# Patient Record
Sex: MALE | Race: BLACK OR AFRICAN AMERICAN | NOT HISPANIC OR LATINO | Employment: FULL TIME | URBAN - METROPOLITAN AREA
[De-identification: names, ages, dates, MRNs, and addresses within clinical notes are randomized per-mention and may not be internally consistent; named-entity substitution may affect disease eponyms.]

---

## 2017-04-22 ENCOUNTER — HOSPITAL ENCOUNTER (EMERGENCY)
Facility: MEDICAL CENTER | Age: 27
End: 2017-04-22
Attending: EMERGENCY MEDICINE
Payer: COMMERCIAL

## 2017-04-22 ENCOUNTER — APPOINTMENT (OUTPATIENT)
Dept: RADIOLOGY | Facility: MEDICAL CENTER | Age: 27
End: 2017-04-22
Attending: EMERGENCY MEDICINE
Payer: COMMERCIAL

## 2017-04-22 VITALS
BODY MASS INDEX: 26.04 KG/M2 | RESPIRATION RATE: 16 BRPM | SYSTOLIC BLOOD PRESSURE: 151 MMHG | HEIGHT: 76 IN | OXYGEN SATURATION: 96 % | HEART RATE: 66 BPM | WEIGHT: 213.85 LBS | TEMPERATURE: 98.2 F | DIASTOLIC BLOOD PRESSURE: 60 MMHG

## 2017-04-22 DIAGNOSIS — M79.641 RIGHT HAND PAIN: ICD-10-CM
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
## 2017-04-22 DIAGNOSIS — S62.201A CLOSED FRACTURE OF FIRST METACARPAL BONE OF RIGHT HAND, UNSPECIFIED FRACTURE MORPHOLOGY, UNSPECIFIED PORTION OF METACARPAL, INITIAL ENCOUNTER: ICD-10-CM

## 2017-04-22 PROCEDURE — 99284 EMERGENCY DEPT VISIT MOD MDM: CPT

## 2017-04-22 PROCEDURE — 73130 X-RAY EXAM OF HAND: CPT | Mod: RT

## 2017-04-22 NOTE — ED AVS SNAPSHOT
Aerovance Access Code: [email or web address]  Expires: 5/22/2017  2:10 AM    Aerovance  A secure, online tool to manage your health information     [employer]’s Aerovance® is a secure, online tool that connects you to your personalized health information from the privacy of your home -- day or night - making it very easy for you to manage your healthcare. Once the activation process is completed, you can even access your medical information using the Aerovance cameron, which is available for free in the [employer] Cameron store or Google Play store.     Aerovance provides the following levels of access (as shown below):   My Chart Features   [facility] Primary Care Doctor [facility]  Specialists [facility]  Urgent  Care Non-[facility]  Primary Care  Doctor   Email your healthcare team securely and privately 24/7 X X X X   Manage appointments: schedule your next appointment; view details of past/upcoming appointments X      Request prescription refills. X      View recent personal medical records, including lab and immunizations X X X X   View health record, including health history, allergies, medications X X X X   Read reports about your outpatient visits, procedures, consult and ER notes X X X X   See your discharge summary, which is a recap of your hospital and/or ER visit that includes your diagnosis, lab results, and care plan. X X       How to register for Aerovance:  1. Go to  [email or web address].  2. Click on the Sign Up Now box, which takes you to the New Member Sign Up page. You will need to provide the following information:  a. Enter your Aerovance Access Code exactly as it appears at the top of this page. (You will not need to use this code after you’ve completed the sign-up process. If you do not sign up before the expiration date, you must request a new code.)   b. Enter your date of birth.   c. Enter your home email address.   d. Click Submit, and follow the next screen’s instructions.  3. Create a Aerovance ID. This will be your [employer]  login ID and cannot be changed, so think of one that is secure and easy to remember.  4. Create a [employer] password. You can change your password at any time.  5. Enter your Password Reset Question and Answer. This can be used at a later time if you forget your password.   6. Enter your e-mail address. This allows you to receive e-mail notifications when new information is available in [employer].  7. Click Sign Up. You can now view your health information.    For assistance activating your [employer] account, call [phone]

## 2017-04-22 NOTE — ED PROVIDER NOTES
"ED Provider Note    CHIEF COMPLAINT  Chief Complaint   Patient presents with   • Hand Injury       HPI  Inessa Byers is a 26 y.o. male who presents tonight with his significant other with a chief complaint of right thumb and hand pain after he injured it falling yesterday. He states he slipped and fell backwards and hyperextended his right thumb. He now complains of increased swelling. He denies any distal paresthesias or any other areas of injury.    REVIEW OF SYSTEMS  See HPI for further details. All other system reviews are negative.    PAST MEDICAL HISTORY  History reviewed. No pertinent past medical history.    FAMILY HISTORY  History reviewed. No pertinent family history.    SOCIAL HISTORY  Social History     Social History   • Marital Status: Single     Spouse Name: N/A   • Number of Children: N/A   • Years of Education: N/A     Social History Main Topics   • Smoking status: Current Some Day Smoker   • Smokeless tobacco: None   • Alcohol Use: Yes   • Drug Use: No   • Sexual Activity: Not Asked     Other Topics Concern   • None     Social History Narrative       SURGICAL HISTORY  History reviewed. No pertinent past surgical history.    CURRENT MEDICATIONS  None    ALLERGIES  No known drug allergies    PHYSICAL EXAM  VITAL SIGNS: /76 mmHg  Pulse 76  Temp(Src) 36.8 °C (98.2 °F)  Resp 16  Ht 1.93 m (6' 4\")  Wt 97 kg (213 lb 13.5 oz)  BMI 26.04 kg/m2  SpO2 95%    Constitutional: Patient is well developed, well nourished in mild distress.  HENT: Normocephalic, atraumatic,  Oropharynx moist , nose normal with no drainage.   Eyes: PERRL, EOMI, Conjunctiva without erythema.  Neck: Supple with  Normal range of motion in flexion, extension and lateral rotation. No tenderness along the bony prominences or paraspinal muscles.   Cardiovascular: Normal heart rate and rhythm. No murmur.  Thorax & Lungs: Clear and equal breath sounds with good excursion. No respiratory distress.  Abdomen: Bowel sounds normal " in all four quadrants. Soft,nontender.  Skin: Warm, Dry, No rashes or abrasions.  Extremities: Peripheral pulses 4/4, right hand reveals a marked amount of soft tissue swelling with ecchymosis on the right thumb extending into the thenar eminence, anatomical snuff box and metacarpal region of the thumb and index fingers. There are no open abrasions, lacerations noted. He has good capillary refill, good distal sensation with limited range of motion of the thumb secondary to pain and swelling.  Musculoskeletal: Normal range of motion in all major joints. No tenderness to palpation.  Neurologic: Alert & oriented x 3, Normal motor function, Normal sensory function.  Psychiatric: Affect normal, Judgment normal, Mood normal.       RADIOLOGY/PROCEDURES  DX-HAND 3+ RIGHT   Final Result         Linear lucency at the base of the first metacarpal, correlate for nondisplaced fracture.      Questionable mild subluxation of the first MCP joint as well. Correlate clinically.            COURSE & MEDICAL DECISION MAKING  Pertinent Labs & Imaging studies reviewed. (See chart for details)  X-ray performed shows a lucency at the base of the 1st metacarpal with possible nondisplaced fracture and questionable mild subluxation of the 1st MCP joint. Patient was placed in a thumb spica splint and will be referred to orthopedic surgery which is Dr. Dayanara Pal on call today. The patient is to ice for the next 24 hours keep it elevated and keep the splint on and call 1st thing Monday morning to follow up with the hand surgeon. He did not want anything for pain at this time therefore he is to take high-dose Motrin 800 mg every 8 hours with food to help reduce the inflammation and pain. He is to return if any numbness or coldness in his fingers.    FINAL IMPRESSION  1.recent fall  2. Right hand pain  3. Closed fracture base of 1st metacarpal         Electronically signed by: Geovanna Campos, 4/22/2017 1:57 AM

## 2017-04-22 NOTE — ED AVS SNAPSHOT
Home Care Instructions                                                                                                                Inessa Byers   MRN: [id number]    Department:  [facility], Emergency Dept   Date of Visit:  4/22/2017            [facility], Emergency Dept    [address] [place] [address]    Phone:  [phone]      You were seen by     Geovanna Campos D.O.      Your Diagnosis Was     Right hand pain     M79.641       Follow-up Information     1. Follow up with Jasper Ramirez M.D.. Schedule an appointment as soon as possible for a visit in 2 days.    Specialty:  Orthopaedics    Why:  call 1st thing Monday morning to schedule an appointment with ortho    Contact information    [address]  [address]  [place] [address] [phone]        Medication Information     Review all of your home medications and newly ordered medications with your primary doctor and/or pharmacist as soon as possible. Follow medication instructions as directed by your doctor and/or pharmacist.     Please keep your complete medication list with you and share with your physician. Update the information when medications are discontinued, doses are changed, or new medications (including over-the-counter products) are added; and carry medication information at all times in the event of emergency situations.               Medication List      Notice     You have not been prescribed any medications.            Procedures and tests performed during your visit     DX-HAND 3+ RIGHT        Discharge Instructions       Cast or Splint Care  Casts and splints support injured limbs and keep bones from moving while they heal. It is important to care for your cast or splint at home.    HOME CARE INSTRUCTIONS  · Keep the cast or splint uncovered during the drying period. It can take 24 to 48 hours to dry if it is made of plaster. A fiberglass cast will dry in less than  1 hour.  · Do not rest the cast on anything harder than a pillow for the first 24 hours.  · Do not put weight on your injured limb or apply pressure to the cast until your health care provider gives you permission.  · Keep the cast or splint dry. Wet casts or splints can lose their shape and may not support the limb as well. A wet cast that has lost its shape can also create harmful pressure on your skin when it dries. Also, wet skin can become infected.  · Cover the cast or splint with a plastic bag when bathing or when out in the rain or snow. If the cast is on the trunk of the body, take sponge baths until the cast is removed.  · If your cast does become wet, dry it with a towel or a blow dryer on the cool setting only.  · Keep your cast or splint clean. Soiled casts may be wiped with a moistened cloth.  · Do not place any hard or soft foreign objects under your cast or splint, such as cotton, toilet paper, lotion, or powder.  · Do not try to scratch the skin under the cast with any object. The object could get stuck inside the cast. Also, scratching could lead to an infection. If itching is a problem, use a blow dryer on a cool setting to relieve discomfort.  · Do not trim or cut your cast or remove padding from inside of it.  · Exercise all joints next to the injury that are not immobilized by the cast or splint. For example, if you have a long leg cast, exercise the hip joint and toes. If you have an arm cast or splint, exercise the shoulder, elbow, thumb, and fingers.  · Elevate your injured arm or leg on 1 or 2 pillows for the first 1 to 3 days to decrease swelling and pain. It is best if you can comfortably elevate your cast so it is higher than your heart.  SEEK MEDICAL CARE IF:   · Your cast or splint cracks.  · Your cast or splint is too tight or too loose.  · You have unbearable itching inside the cast.  · Your cast becomes wet or develops a soft spot or area.  · You have a bad smell coming from inside  your cast.  · You get an object stuck under your cast.  · Your skin around the cast becomes red or raw.  · You have new pain or worsening pain after the cast has been applied.  SEEK IMMEDIATE MEDICAL CARE IF:   · You have fluid leaking through the cast.  · You are unable to move your fingers or toes.  · You have discolored (blue or white), cool, painful, or very swollen fingers or toes beyond the cast.  · You have tingling or numbness around the injured area.  · You have severe pain or pressure under the cast.  · You have any difficulty with your breathing or have shortness of breath.  · You have chest pain.     This information is not intended to replace advice given to you by your health care provider. Make sure you discuss any questions you have with your health care provider.     Document Released: 12/15/2001 Document Revised: 10/08/2014 Document Reviewed: 06/26/2014  [employer] Interactive Patient Education ©2016 [employer]    Hand Fracture  Your caregiver has diagnosed you with a fractured (broken) bone in your hand. If the bones are in good position and the hand is properly immobilized and rested, these injuries will usually heal in 3 to 6 weeks. A cast, splint, or bulky bandage is usually applied to keep the fracture site from moving. Do not remove the splint or cast until your caregiver approves. If the fracture is unstable or the bones are not aligned properly, surgery may be needed.  Keep your hand raised (elevated) above the level of your heart as much as possible for the next 2 to 3 days until the swelling and pain are better. Apply ice packs for 15-20 minutes every 3 to 4 hours to help control the pain and swelling.  See your caregiver or an orthopedic specialist as directed for follow-up care to make sure the fracture is beginning to heal properly.  SEEK IMMEDIATE MEDICAL CARE IF:   · You notice your fingers are cold, numb, crooked, or the pain of your injury is severe.  · You are not improving or  seem to be getting worse.  · You have questions or concerns.  Document Released: 01/25/2006 Document Revised: 03/11/2013 Document Reviewed: 06/15/2010  ExitCare® Patient Information ©2014 [employer].    Hand Injuries  Minor breaks (fractures), sprains, bruises (contusions), and burns of the hand are all examples of hand injuries. A fracture is a break in the bone. A sprain means that ligaments have been stretched or torn. A contusion is the result of an injury that caused bleeding under the skin. Burns are damage to the skin that occurs when the hand comes in contact with something very hot (or with certain chemicals).   HOME CARE INSTRUCTIONS  · For sprains, keep your hand raised (elevated) above the level of your heart. Do this until the pain and swelling improve.   · Use hand bandages (dressings) and splints to reduce motion, relieve pain, and prevent reinjury as directed. The dressing and splint should not be removed without your caregiver's approval.   · Put ice on the injured area to reduce pain and swelling due to fractures, sprains, and deep bruises.   · Put ice in a plastic bag.   · Place a towel between your skin and the bag.   · Leave the ice on for 15-20 minutes, 3-4 times a day. Do this for 2 3 days.   · Only take over-the-counter or prescription medicines as directed by your caregiver.   · Follow up with your caregiver or a specialist as directed.   Early motion exercises are sometimes needed to reduce joint stiffness after a hand injury. However, your hand should not be used for any activities that increase pain.  SEEK MEDICAL CARE IF:  · Your pain or swelling does not improve in 2 3 days.   SEEK IMMEDIATE MEDICAL CARE IF:  · You have increased pain, swelling, or redness in your hand.   · Your pain is not controlled with medicine.   · You have a fever or persistent symptoms for more than 2 3 days.   · You have a fever and your symptoms suddenly get worse.   · You have pain when moving your fingers.    · You have pus coming from the wound.   · You have numbness in your fingers.   MAKE SURE YOU:  · Understand these instructions.   · Will watch your condition.   · Will get help right away if you are not doing well or get worse.   Document Released: 01/25/2006 Document Revised: 09/11/2013 Document Reviewed: 06/30/2013  ExitCare® Patient Information ©2013 [employer].  Metacarpal Fracture  A metacarpal fracture is a break (fracture) of a bone in the hand. Metacarpals are the bones that extend from your knuckles to your wrist. In each hand, you have five metacarpal bones that connect your fingers and your thumb to your wrist.  Some hand fractures have bone pieces that are close together and stable (simple). These fractures may be treated with only a splint or cast. Hand fractures that have many pieces of broken bone (comminuted), unstable bone pieces (displaced), or a bone that breaks through the skin (compound) usually require surgery.  CAUSES  This injury may be caused by:  · A fall.  · A hard, direct hit to your hand.  · An injury that squeezes your knuckle, stretches your finger out of place, or crushes your hand.  RISK FACTORS  This injury is more likely to occur if:  · You play contact sports.  · You have certain bone diseases.  SYMPTOMS   Symptoms of this type of fracture develop soon after the injury. Symptoms may include:  · Swelling.  · Pain.  · Stiffness.  · Increased pain with movement.  · Bruising.  · Inability to move a finger.  · A shortened finger.  · A finger knuckle that looks sunken in.  · Unusual appearance of the hand or finger (deformity).  DIAGNOSIS   This injury may be diagnosed based on your signs and symptoms, especially if you had a recent hand injury. Your health care provider will perform a physical exam. He or she may also order X-rays to confirm the diagnosis.   TREATMENT   Treatment for this injury depends on the type of fracture you have and how severe it is. Possible treatments  include:  · Non-reduction. This can be done if the bone does not need to be moved back into place. The fracture can be casted or splinted as it is.    · Closed reduction. If your bone is stable and can be moved back into place, you may only need to wear a cast or splint or have maura taping.  · Closed reduction with internal fixation (CRIF). This is the most common treatment. You may have this procedure if your bone can be moved back into place but needs more support. Wires, pins, or screws may be inserted through your skin to stabilize the fracture.  · Open reduction with internal fixation (ORIF). This may be needed if your fracture is severe and unstable. It involves surgery to move your bone back into the right position. Screws, wires, or plates are used to stabilize the fracture.  After all procedures, you may need to wear a cast or a splint for several weeks. You will also need to have follow-up X-rays to make sure that the bone is healing well and staying in position. After you no longer need your cast or splint, you may need physical therapy. This will help you to regain full movement and strength in your hand.   HOME CARE INSTRUCTIONS   If You Have a Cast:  · Do not stick anything inside the cast to scratch your skin. Doing that increases your risk of infection.  · Check the skin around the cast every day. Report any concerns to your health care provider. You may put lotion on dry skin around the edges of the cast. Do not apply lotion to the skin underneath the cast.  If You Have a Splint:  · Wear it as directed by your health care provider. Remove it only as directed by your health care provider.  · Loosen the splint if your fingers become numb and tingle, or if they turn cold and blue.  Bathing  · Cover the cast or splint with a watertight plastic bag to protect it from water while you take a bath or a shower. Do not let the cast or splint get wet.  Managing Pain, Stiffness, and Swelling  · If directed,  apply ice to the injured area (if you have a splint, not a cast):  ¨ Put ice in a plastic bag.  ¨ Place a towel between your skin and the bag.  ¨ Leave the ice on for 20 minutes, 2-3 times a day.  · Move your fingers often to avoid stiffness and to lessen swelling.  · Raise the injured area above the level of your heart while you are sitting or lying down.  Driving  · Do not drive or operate heavy machinery while taking pain medicine.  · Do not drive while wearing a cast or splint on a hand or foot that you use for driving.  Activity  · Return to your normal activities as directed by your health care provider. Ask your health care provider what activities are safe for you.  General Instructions  · Do not put pressure on any part of the cast or splint until it is fully hardened. This may take several hours.  · Keep the cast or splint clean and dry.  · Do not use any tobacco products, including cigarettes, chewing tobacco, or electronic cigarettes. Tobacco can delay bone healing. If you need help quitting, ask your health care provider.  · Take medicines only as directed by your health care provider.  · Keep all follow-up visits as directed by your health care provider. This is important.  SEEK MEDICAL CARE IF:   · Your pain is getting worse.  · You have redness, swelling, or pain in the injured area.    · You have fluid, blood, or pus coming from under your cast or splint.    · You notice a bad smell coming from under your cast or splint.    · You have a fever.    SEEK IMMEDIATE MEDICAL CARE IF:   · You develop a rash.    · You have trouble breathing.    · Your skin or nails on your injured hand turn blue or gray even after you loosen your splint.  · Your injured hand feels cold or becomes numb even after you loosen your splint.    · You develop severe pain under the cast or in your hand.     This information is not intended to replace advice given to you by your health care provider. Make sure you discuss any  questions you have with your health care provider.     Document Released: 12/18/2006 Document Revised: 05/03/2016 Document Reviewed: 10/07/2015  [employer] Interactive Patient Education ©2016 [employer]      Ice and elevate as much as possible  Wear the splint until seen by orthopedic  Call 1st thing Monday morning to schedule an appointment with Dr. Ramirez  Ibuprofen 800 mg every 8 hours with food for pain and swelling          Patient Information     Patient Information    Following emergency treatment: all patient requiring follow-up care must return either to a private physician or a clinic if your condition worsens before you are able to obtain further medical attention, please return to the emergency room.     Billing Information    At [facility], we work to make the billing process streamlined for our patients.  Our Representatives are here to answer any questions you may have regarding your hospital bill.  If you have insurance coverage and have supplied your insurance information to us, we will submit a claim to your insurer on your behalf.  Should you have any questions regarding your bill, we can be reached online or by phone as follows:  Online: You are able pay your bills online or live chat with our representatives about any billing questions you may have. We are here to help Monday - Friday from 8:00am to 7:30pm and 9:00am - 12:00pm on Saturdays.  Please visit [email or web address]  for more information.   Phone:  [phone] or [phone]    Please note that your emergency physician, surgeon, pathologist, radiologist, anesthesiologist, and other specialists are not employed by [facility] and will therefore bill separately for their services.  Please contact them directly for any questions concerning their bills at the numbers below:     Emergency Physician Services:  [phone]  [facility]:  [phone]  [facility]:   [phone]  [facility]:  [phone]    1. Your final bill may vary from the amount quoted upon discharge if all procedures are not complete at that time, or if your doctor has additional procedures of which we are not aware. You will receive an additional bill if you return to the Emergency Department at [facility] for suture removal regardless of the facility of which the sutures were placed.     2. Please arrange for settlement of this account at the emergency registration.    3. All self-pay accounts are due in full at the time of treatment.  If you are unable to meet this obligation then payment is expected within 4-5 days.     4. If you have had radiology studies (CT, X-ray, Ultrasound, MRI), you have received a preliminary result during your emergency department visit. Please contact the radiology department [phone] to receive a copy of your final result. Please discuss the Final result with your primary physician or with the follow up physician provided.     Crisis Hotline:  [place] Crisis Hotline:  6-511-VKTZRCM or 1-311.952.7938  [place] Crisis Hotline:    1-295.924.6190 or 381-913-0553         ED Discharge Follow Up Questions    1. In order to provide you with very good care, we would like to follow up with a phone call in the next few days.  May we have your permission to contact you?     YES /  NO    2. What is the best phone number to call you? (       )_____-__________    3. What is the best time to call you?      Morning  /  Afternoon  /  Evening                   Patient Signature:  ____________________________________________________________    Date:  ____________________________________________________________

## 2017-04-22 NOTE — ED NOTES
velcro splint applied . D/c pt home . Pt aware of f/u instructions , aware to return for any changes or concerns. No further questions upon d/c home from ed

## 2017-04-22 NOTE — DISCHARGE INSTRUCTIONS
Cast or Splint Care  Casts and splints support injured limbs and keep bones from moving while they heal. It is important to care for your cast or splint at home.    HOME CARE INSTRUCTIONS  · Keep the cast or splint uncovered during the drying period. It can take 24 to 48 hours to dry if it is made of plaster. A fiberglass cast will dry in less than 1 hour.  · Do not rest the cast on anything harder than a pillow for the first 24 hours.  · Do not put weight on your injured limb or apply pressure to the cast until your health care provider gives you permission.  · Keep the cast or splint dry. Wet casts or splints can lose their shape and may not support the limb as well. A wet cast that has lost its shape can also create harmful pressure on your skin when it dries. Also, wet skin can become infected.  · Cover the cast or splint with a plastic bag when bathing or when out in the rain or snow. If the cast is on the trunk of the body, take sponge baths until the cast is removed.  · If your cast does become wet, dry it with a towel or a blow dryer on the cool setting only.  · Keep your cast or splint clean. Soiled casts may be wiped with a moistened cloth.  · Do not place any hard or soft foreign objects under your cast or splint, such as cotton, toilet paper, lotion, or powder.  · Do not try to scratch the skin under the cast with any object. The object could get stuck inside the cast. Also, scratching could lead to an infection. If itching is a problem, use a blow dryer on a cool setting to relieve discomfort.  · Do not trim or cut your cast or remove padding from inside of it.  · Exercise all joints next to the injury that are not immobilized by the cast or splint. For example, if you have a long leg cast, exercise the hip joint and toes. If you have an arm cast or splint, exercise the shoulder, elbow, thumb, and fingers.  · Elevate your injured arm or leg on 1 or 2 pillows for the first 1 to 3 days to decrease  swelling and pain. It is best if you can comfortably elevate your cast so it is higher than your heart.  SEEK MEDICAL CARE IF:   · Your cast or splint cracks.  · Your cast or splint is too tight or too loose.  · You have unbearable itching inside the cast.  · Your cast becomes wet or develops a soft spot or area.  · You have a bad smell coming from inside your cast.  · You get an object stuck under your cast.  · Your skin around the cast becomes red or raw.  · You have new pain or worsening pain after the cast has been applied.  SEEK IMMEDIATE MEDICAL CARE IF:   · You have fluid leaking through the cast.  · You are unable to move your fingers or toes.  · You have discolored (blue or white), cool, painful, or very swollen fingers or toes beyond the cast.  · You have tingling or numbness around the injured area.  · You have severe pain or pressure under the cast.  · You have any difficulty with your breathing or have shortness of breath.  · You have chest pain.     This information is not intended to replace advice given to you by your health care provider. Make sure you discuss any questions you have with your health care provider.     Document Released: 12/15/2001 Document Revised: 10/08/2014 Document Reviewed: 06/26/2014  [employer] Interactive Patient Education ©2016 [employer]    Hand Fracture  Your caregiver has diagnosed you with a fractured (broken) bone in your hand. If the bones are in good position and the hand is properly immobilized and rested, these injuries will usually heal in 3 to 6 weeks. A cast, splint, or bulky bandage is usually applied to keep the fracture site from moving. Do not remove the splint or cast until your caregiver approves. If the fracture is unstable or the bones are not aligned properly, surgery may be needed.  Keep your hand raised (elevated) above the level of your heart as much as possible for the next 2 to 3 days until the swelling and pain are better. Apply ice packs for  15-20 minutes every 3 to 4 hours to help control the pain and swelling.  See your caregiver or an orthopedic specialist as directed for follow-up care to make sure the fracture is beginning to heal properly.  SEEK IMMEDIATE MEDICAL CARE IF:   · You notice your fingers are cold, numb, crooked, or the pain of your injury is severe.  · You are not improving or seem to be getting worse.  · You have questions or concerns.  Document Released: 01/25/2006 Document Revised: 03/11/2013 Document Reviewed: 06/15/2010  [employer]® Patient Information ©2014 [employer].    Hand Injuries  Minor breaks (fractures), sprains, bruises (contusions), and burns of the hand are all examples of hand injuries. A fracture is a break in the bone. A sprain means that ligaments have been stretched or torn. A contusion is the result of an injury that caused bleeding under the skin. Burns are damage to the skin that occurs when the hand comes in contact with something very hot (or with certain chemicals).   HOME CARE INSTRUCTIONS  · For sprains, keep your hand raised (elevated) above the level of your heart. Do this until the pain and swelling improve.   · Use hand bandages (dressings) and splints to reduce motion, relieve pain, and prevent reinjury as directed. The dressing and splint should not be removed without your caregiver's approval.   · Put ice on the injured area to reduce pain and swelling due to fractures, sprains, and deep bruises.   · Put ice in a plastic bag.   · Place a towel between your skin and the bag.   · Leave the ice on for 15-20 minutes, 3-4 times a day. Do this for 2 3 days.   · Only take over-the-counter or prescription medicines as directed by your caregiver.   · Follow up with your caregiver or a specialist as directed.   Early motion exercises are sometimes needed to reduce joint stiffness after a hand injury. However, your hand should not be used for any activities that increase pain.  SEEK MEDICAL CARE IF:  · Your  pain or swelling does not improve in 2 3 days.   SEEK IMMEDIATE MEDICAL CARE IF:  · You have increased pain, swelling, or redness in your hand.   · Your pain is not controlled with medicine.   · You have a fever or persistent symptoms for more than 2 3 days.   · You have a fever and your symptoms suddenly get worse.   · You have pain when moving your fingers.   · You have pus coming from the wound.   · You have numbness in your fingers.   MAKE SURE YOU:  · Understand these instructions.   · Will watch your condition.   · Will get help right away if you are not doing well or get worse.   Document Released: 01/25/2006 Document Revised: 09/11/2013 Document Reviewed: 06/30/2013  [employer]® Patient Information ©2013 [employer].  Metacarpal Fracture  A metacarpal fracture is a break (fracture) of a bone in the hand. Metacarpals are the bones that extend from your knuckles to your wrist. In each hand, you have five metacarpal bones that connect your fingers and your thumb to your wrist.  Some hand fractures have bone pieces that are close together and stable (simple). These fractures may be treated with only a splint or cast. Hand fractures that have many pieces of broken bone (comminuted), unstable bone pieces (displaced), or a bone that breaks through the skin (compound) usually require surgery.  CAUSES  This injury may be caused by:  · A fall.  · A hard, direct hit to your hand.  · An injury that squeezes your knuckle, stretches your finger out of place, or crushes your hand.  RISK FACTORS  This injury is more likely to occur if:  · You play contact sports.  · You have certain bone diseases.  SYMPTOMS   Symptoms of this type of fracture develop soon after the injury. Symptoms may include:  · Swelling.  · Pain.  · Stiffness.  · Increased pain with movement.  · Bruising.  · Inability to move a finger.  · A shortened finger.  · A finger knuckle that looks sunken in.  · Unusual appearance of the hand or finger  (deformity).  DIAGNOSIS   This injury may be diagnosed based on your signs and symptoms, especially if you had a recent hand injury. Your health care provider will perform a physical exam. He or she may also order X-rays to confirm the diagnosis.   TREATMENT   Treatment for this injury depends on the type of fracture you have and how severe it is. Possible treatments include:  · Non-reduction. This can be done if the bone does not need to be moved back into place. The fracture can be casted or splinted as it is.    · Closed reduction. If your bone is stable and can be moved back into place, you may only need to wear a cast or splint or have maura taping.  · Closed reduction with internal fixation (CRIF). This is the most common treatment. You may have this procedure if your bone can be moved back into place but needs more support. Wires, pins, or screws may be inserted through your skin to stabilize the fracture.  · Open reduction with internal fixation (ORIF). This may be needed if your fracture is severe and unstable. It involves surgery to move your bone back into the right position. Screws, wires, or plates are used to stabilize the fracture.  After all procedures, you may need to wear a cast or a splint for several weeks. You will also need to have follow-up X-rays to make sure that the bone is healing well and staying in position. After you no longer need your cast or splint, you may need physical therapy. This will help you to regain full movement and strength in your hand.   HOME CARE INSTRUCTIONS   If You Have a Cast:  · Do not stick anything inside the cast to scratch your skin. Doing that increases your risk of infection.  · Check the skin around the cast every day. Report any concerns to your health care provider. You may put lotion on dry skin around the edges of the cast. Do not apply lotion to the skin underneath the cast.  If You Have a Splint:  · Wear it as directed by your health care provider.  Remove it only as directed by your health care provider.  · Loosen the splint if your fingers become numb and tingle, or if they turn cold and blue.  Bathing  · Cover the cast or splint with a watertight plastic bag to protect it from water while you take a bath or a shower. Do not let the cast or splint get wet.  Managing Pain, Stiffness, and Swelling  · If directed, apply ice to the injured area (if you have a splint, not a cast):  ¨ Put ice in a plastic bag.  ¨ Place a towel between your skin and the bag.  ¨ Leave the ice on for 20 minutes, 2-3 times a day.  · Move your fingers often to avoid stiffness and to lessen swelling.  · Raise the injured area above the level of your heart while you are sitting or lying down.  Driving  · Do not drive or operate heavy machinery while taking pain medicine.  · Do not drive while wearing a cast or splint on a hand or foot that you use for driving.  Activity  · Return to your normal activities as directed by your health care provider. Ask your health care provider what activities are safe for you.  General Instructions  · Do not put pressure on any part of the cast or splint until it is fully hardened. This may take several hours.  · Keep the cast or splint clean and dry.  · Do not use any tobacco products, including cigarettes, chewing tobacco, or electronic cigarettes. Tobacco can delay bone healing. If you need help quitting, ask your health care provider.  · Take medicines only as directed by your health care provider.  · Keep all follow-up visits as directed by your health care provider. This is important.  SEEK MEDICAL CARE IF:   · Your pain is getting worse.  · You have redness, swelling, or pain in the injured area.    · You have fluid, blood, or pus coming from under your cast or splint.    · You notice a bad smell coming from under your cast or splint.    · You have a fever.    SEEK IMMEDIATE MEDICAL CARE IF:   · You develop a rash.    · You have trouble breathing.     · Your skin or nails on your injured hand turn blue or gray even after you loosen your splint.  · Your injured hand feels cold or becomes numb even after you loosen your splint.    · You develop severe pain under the cast or in your hand.     This information is not intended to replace advice given to you by your health care provider. Make sure you discuss any questions you have with your health care provider.     Document Released: 12/18/2006 Document Revised: 05/03/2016 Document Reviewed: 10/07/2015  [employer] Interactive Patient Education ©2016 [employer]      Ice and elevate as much as possible  Wear the splint until seen by orthopedic  Call 1st thing Monday morning to schedule an appointment with Dr. Ramirez  Ibuprofen 800 mg every 8 hours with food for pain and swelling

## 2017-04-22 NOTE — ED AVS SNAPSHOT
4/22/2017    Inessa Byers  [address] [place] [address]    Dear Inessa:    [facility] wants to ensure your discharge home is safe and you or your loved ones have had all of your questions answered regarding your care after you leave the hospital.    Below is a list of resources and contact information should you have any questions regarding your hospital stay, follow-up instructions, or active medical symptoms.    Questions or Concerns Regarding… Contact   Medical Questions Related to Your Discharge  (7 days a week, 8am-5pm) Contact a Nurse Care Coordinator   [phone]   Medical Questions Not Related to Your Discharge  (24 hours a day / 7 days a week)  Contact the Nurse Health Line   [phone]    Medications or Discharge Instructions Refer to your discharge packet   or contact your [facility] Primary Care Provider   [phone]   Follow-up Appointment(s) Schedule your appointment via [employer]   or contact Scheduling [phone]   Billing Review your statement via [employer]  or contact Billing [phone]   Medical Records Review your records via [employer]   or contact Medical Records [phone]     You may receive a telephone call within two days of discharge. This call is to make certain you understand your discharge instructions and have the opportunity to have any questions answered. You can also easily access your medical information, test results and upcoming appointments via the [employer] free online health management tool. You can learn more and sign up at [employer]. For assistance setting up your [employer] account, please call [phone].    Once again, we want to ensure your discharge home is safe and that you have a clear understanding of any next steps in your care. If you have any questions or concerns, please do not hesitate to contact us, we are here for you. Thank you for choosing [facility] for your healthcare needs.    Sincerely,    Your [facility] Healthcare Team

## 2019-05-30 ENCOUNTER — OFFICE VISIT (OUTPATIENT)
Dept: URGENT CARE | Facility: CLINIC | Age: 29
End: 2019-05-30

## 2019-05-30 VITALS
HEIGHT: 76 IN | OXYGEN SATURATION: 99 % | SYSTOLIC BLOOD PRESSURE: 118 MMHG | RESPIRATION RATE: 14 BRPM | BODY MASS INDEX: 25.33 KG/M2 | WEIGHT: 208 LBS | TEMPERATURE: 97.7 F | HEART RATE: 74 BPM | DIASTOLIC BLOOD PRESSURE: 74 MMHG

## 2019-05-30 DIAGNOSIS — J03.90 TONSILLITIS: ICD-10-CM

## 2019-05-30 DIAGNOSIS — J02.9 PHARYNGITIS, UNSPECIFIED ETIOLOGY: ICD-10-CM

## 2019-05-30 PROCEDURE — 99204 OFFICE O/P NEW MOD 45 MIN: CPT | Performed by: NURSE PRACTITIONER

## 2019-05-30 RX ORDER — AMOXICILLIN 500 MG/1
500 CAPSULE ORAL 2 TIMES DAILY
Qty: 20 CAP | Refills: 0 | Status: SHIPPED | OUTPATIENT
Start: 2019-05-30 | End: 2019-06-09

## 2019-05-31 ASSESSMENT — ENCOUNTER SYMPTOMS
CHILLS: 1
EYE PAIN: 0
DIZZINESS: 0
NECK PAIN: 0
TROUBLE SWALLOWING: 1
FEVER: 1
VOMITING: 0
NAUSEA: 0
MYALGIAS: 0
COUGH: 0
SWOLLEN GLANDS: 1
SORE THROAT: 1
SHORTNESS OF BREATH: 0
HEADACHES: 0

## 2019-05-31 NOTE — PROGRESS NOTES
Subjective:     Inessa Byers is a 28 y.o. male who presents for Pharyngitis (sore throat x 2 days)       Pharyngitis    This is a new problem. Episode onset: 3 days. The problem has been gradually worsening. Neither side of throat is experiencing more pain than the other. The maximum temperature recorded prior to his arrival was 100.4 - 100.9 F. The fever has been present for 1 to 2 days. The pain is at a severity of 10/10. The pain is severe. Associated symptoms include swollen glands and trouble swallowing. Pertinent negatives include no congestion, coughing, ear discharge, ear pain, headaches, neck pain, shortness of breath or vomiting. He has had no exposure to strep. He has tried acetaminophen for the symptoms. The treatment provided no relief.   History reviewed. No pertinent past medical history.History reviewed. No pertinent surgical history.  Social History     Social History   • Marital status: Single     Spouse name: N/A   • Number of children: N/A   • Years of education: N/A     Occupational History   • Not on file.     Social History Main Topics   • Smoking status: Current Some Day Smoker   • Smokeless tobacco: Never Used   • Alcohol use Yes   • Drug use: No   • Sexual activity: Not on file     Other Topics Concern   • Not on file     Social History Narrative   • No narrative on file    History reviewed. No pertinent family history. Review of Systems   Constitutional: Positive for chills and fever.   HENT: Positive for sore throat and trouble swallowing. Negative for congestion, ear discharge and ear pain.    Eyes: Negative for pain.   Respiratory: Negative for cough and shortness of breath.    Cardiovascular: Negative for chest pain.   Gastrointestinal: Negative for nausea and vomiting.   Genitourinary: Negative for hematuria.   Musculoskeletal: Negative for myalgias and neck pain.   Skin: Negative for rash.   Neurological: Negative for dizziness and headaches.   No Known Allergies   Objective:  "  /74 (BP Location: Left arm, Patient Position: Sitting, BP Cuff Size: Adult)   Pulse 74   Temp 36.5 °C (97.7 °F) (Temporal)   Resp 14   Ht 1.93 m (6' 3.98\")   Wt 94.3 kg (208 lb)   SpO2 99%   BMI 25.33 kg/m²   Physical Exam   Constitutional: He is oriented to person, place, and time. He appears well-developed and well-nourished. No distress.   HENT:   Head: Normocephalic and atraumatic.   Right Ear: Tympanic membrane normal.   Left Ear: Tympanic membrane normal.   Nose: Nose normal. Right sinus exhibits no maxillary sinus tenderness and no frontal sinus tenderness. Left sinus exhibits no maxillary sinus tenderness and no frontal sinus tenderness.   Mouth/Throat: Uvula is midline and mucous membranes are normal. Posterior oropharyngeal erythema present. No posterior oropharyngeal edema or tonsillar abscesses. Tonsils are 2+ on the right. Tonsils are 2+ on the left. No tonsillar exudate.   Eyes: Pupils are equal, round, and reactive to light. Conjunctivae and EOM are normal. Right eye exhibits no discharge. Left eye exhibits no discharge.   Cardiovascular: Normal rate and regular rhythm.    No murmur heard.  Pulmonary/Chest: Effort normal and breath sounds normal. No respiratory distress.   Abdominal: Soft. He exhibits no distension. There is no tenderness.   Neurological: He is alert and oriented to person, place, and time. He has normal reflexes. No sensory deficit.   Skin: Skin is warm, dry and intact.   Psychiatric: He has a normal mood and affect.         Assessment/Plan:   Assessment    1. Tonsillitis  amoxicillin (AMOXIL) 500 MG Cap   2. Pharyngitis, unspecified etiology       Advised to continue supportive care with Tylenol and/or ibuprofen for fevers and discomfort. Increased fluids and electrolytes.  Differential diagnosis, natural history, supportive care, and indications for immediate follow-up discussed.  "

## 2019-08-06 ENCOUNTER — OFFICE VISIT (OUTPATIENT)
Dept: URGENT CARE | Facility: CLINIC | Age: 29
End: 2019-08-06

## 2019-08-06 VITALS
RESPIRATION RATE: 18 BRPM | BODY MASS INDEX: 25.57 KG/M2 | HEIGHT: 76 IN | TEMPERATURE: 99.1 F | HEART RATE: 90 BPM | WEIGHT: 210 LBS | SYSTOLIC BLOOD PRESSURE: 108 MMHG | DIASTOLIC BLOOD PRESSURE: 68 MMHG | OXYGEN SATURATION: 100 %

## 2019-08-06 DIAGNOSIS — J02.0 STREP PHARYNGITIS: ICD-10-CM

## 2019-08-06 DIAGNOSIS — J02.9 SORE THROAT: ICD-10-CM

## 2019-08-06 LAB
INT CON NEG: NORMAL
INT CON POS: NORMAL
S PYO AG THROAT QL: POSITIVE

## 2019-08-06 PROCEDURE — 99214 OFFICE O/P EST MOD 30 MIN: CPT | Performed by: NURSE PRACTITIONER

## 2019-08-06 PROCEDURE — 87880 STREP A ASSAY W/OPTIC: CPT | Performed by: NURSE PRACTITIONER

## 2019-08-06 RX ORDER — AMOXICILLIN 875 MG/1
875 TABLET, COATED ORAL 2 TIMES DAILY
Qty: 20 TAB | Refills: 0 | Status: SHIPPED | OUTPATIENT
Start: 2019-08-06 | End: 2019-08-16

## 2019-08-06 ASSESSMENT — ENCOUNTER SYMPTOMS
MUSCULOSKELETAL NEGATIVE: 1
DIARRHEA: 0
TROUBLE SWALLOWING: 1
DOUBLE VISION: 0
FEVER: 0
NAUSEA: 0
SORE THROAT: 1
SWOLLEN GLANDS: 1
PALPITATIONS: 0
DIZZINESS: 0
WHEEZING: 0
HEADACHES: 0
BLURRED VISION: 0
ABDOMINAL PAIN: 0
VOMITING: 0
CONSTIPATION: 0
CHILLS: 0
COUGH: 0
STRIDOR: 0

## 2019-08-06 NOTE — PROGRESS NOTES
"Subjective:   Inessa Byers is a 28 y.o. male who presents for Sore Throat ([id number], sore throat, white in back of throat)        Pharyngitis    This is a new problem. The current episode started yesterday. The problem has been waxing and waning. Neither side of throat is experiencing more pain than the other. The pain is moderate. Associated symptoms include swollen glands and trouble swallowing. Pertinent negatives include no abdominal pain, congestion, coughing, diarrhea, ear discharge, ear pain, headaches, stridor or vomiting. He has tried NSAIDs for the symptoms. The treatment provided mild relief.        Review of Systems   Constitutional: Negative for chills and fever.   HENT: Positive for sore throat and trouble swallowing. Negative for congestion, ear discharge and ear pain.    Eyes: Negative for blurred vision and double vision.   Respiratory: Negative for cough, wheezing and stridor.    Cardiovascular: Negative for chest pain and palpitations.   Gastrointestinal: Negative for abdominal pain, constipation, diarrhea, nausea and vomiting.   Musculoskeletal: Negative.    Skin: Negative.  Negative for itching and rash.   Neurological: Negative for dizziness and headaches.   All other systems reviewed and are negative.    Patient's PMH, SocHx, SurgHx, FamHx, Drug allergies and medications reviewed.     Objective:   /68 (BP Location: Left arm, Patient Position: Sitting, BP Cuff Size: Adult)   Pulse 90   Temp 37.3 °C (99.1 °F) (Temporal)   Resp 18   Ht 1.93 m (6' 4\")   Wt 95.3 kg (210 lb)   SpO2 100%   BMI 25.56 kg/m²   Physical Exam   Constitutional: He is oriented to person, place, and time. He appears well-developed and well-nourished. No distress.   HENT:   Head: Normocephalic.   Right Ear: Hearing, tympanic membrane and ear canal normal. Tympanic membrane is not erythematous. No middle ear effusion.   Left Ear: Hearing, tympanic membrane and ear canal normal. Tympanic membrane is not " erythematous.  No middle ear effusion.   Nose: No rhinorrhea. Right sinus exhibits no maxillary sinus tenderness and no frontal sinus tenderness. Left sinus exhibits no maxillary sinus tenderness and no frontal sinus tenderness.   Mouth/Throat: Mucous membranes are normal. Posterior oropharyngeal erythema present. Tonsils are 3+ on the right. Tonsils are 3+ on the left. Tonsillar exudate.   Eyes: Pupils are equal, round, and reactive to light. Conjunctivae, EOM and lids are normal.   Neck: Normal range of motion. No thyromegaly present.   Cardiovascular: Normal rate, regular rhythm and normal heart sounds.   Pulmonary/Chest: Effort normal and breath sounds normal. No respiratory distress. He has no wheezes.   Lymphadenopathy:        Head (right side): Tonsillar adenopathy present. No submandibular adenopathy present.        Head (left side): No submandibular and no tonsillar adenopathy present.   Neurological: He is alert and oriented to person, place, and time.   Skin: Skin is warm and dry. No rash noted. He is not diaphoretic.   Psychiatric: He has a normal mood and affect. His speech is normal and behavior is normal. Judgment and thought content normal.   Vitals reviewed.        Assessment/Plan:   Assessment    1. Sore throat  - POCT Rapid Strep A    2. Strep pharyngitis  - amoxicillin (AMOXIL) 875 MG tablet; Take 1 Tab by mouth 2 times a day for 10 days.  Dispense: 20 Tab; Refill: 0    Rapid strep positive. Change toothbrush in 2 days  Patient encouraged to increase clear liquid intake    Differential diagnosis, natural history, supportive care, and indications for immediate follow-up discussed.     **Please note that all invasive procedures during this visit were performed by myself and/or the Medical Assistant under the supervision of the PA or MD in office**

## 2019-10-10 ENCOUNTER — OFFICE VISIT (OUTPATIENT)
Dept: URGENT CARE | Facility: CLINIC | Age: 29
End: 2019-10-10

## 2019-10-10 VITALS
TEMPERATURE: 99 F | HEART RATE: 94 BPM | WEIGHT: 206 LBS | HEIGHT: 76 IN | SYSTOLIC BLOOD PRESSURE: 122 MMHG | DIASTOLIC BLOOD PRESSURE: 74 MMHG | BODY MASS INDEX: 25.09 KG/M2 | OXYGEN SATURATION: 96 % | RESPIRATION RATE: 16 BRPM

## 2019-10-10 DIAGNOSIS — J03.90 EXUDATIVE TONSILLITIS: ICD-10-CM

## 2019-10-10 LAB
HETEROPH AB SER QL LA: NEGATIVE
INT CON NEG: NEGATIVE
INT CON NEG: NEGATIVE
INT CON POS: POSITIVE
INT CON POS: POSITIVE
S PYO AG THROAT QL: NEGATIVE

## 2019-10-10 PROCEDURE — 87880 STREP A ASSAY W/OPTIC: CPT | Performed by: PHYSICIAN ASSISTANT

## 2019-10-10 PROCEDURE — 99214 OFFICE O/P EST MOD 30 MIN: CPT | Performed by: PHYSICIAN ASSISTANT

## 2019-10-10 PROCEDURE — 86308 HETEROPHILE ANTIBODY SCREEN: CPT | Performed by: PHYSICIAN ASSISTANT

## 2019-10-10 RX ORDER — AMOXICILLIN 500 MG/1
500 CAPSULE ORAL 2 TIMES DAILY
Qty: 20 CAP | Refills: 0 | Status: SHIPPED | OUTPATIENT
Start: 2019-10-10 | End: 2019-10-20

## 2019-10-11 ASSESSMENT — ENCOUNTER SYMPTOMS
COUGH: 0
CHILLS: 0
EYE REDNESS: 0
SPUTUM PRODUCTION: 0
TROUBLE SWALLOWING: 1
WHEEZING: 0
SHORTNESS OF BREATH: 0
FEVER: 1
STRIDOR: 0
EYE DISCHARGE: 0
EYE PAIN: 0
HEMOPTYSIS: 0
HEADACHES: 1
PALPITATIONS: 0
SORE THROAT: 1

## 2019-10-11 NOTE — PROGRESS NOTES
"Subjective:      Inessa Byers is a 29 y.o. male who presents with Pharyngitis (x yesterday, sore throat, white spots on back of throat)            Pharyngitis    This is a new problem. The current episode started in the past 7 days. The problem has been unchanged. Associated symptoms include congestion, ear pain, headaches and trouble swallowing. Pertinent negatives include no coughing, ear discharge, shortness of breath or stridor. He has tried NSAIDs for the symptoms. The treatment provided no relief.       Review of Systems   Constitutional: Positive for fever and malaise/fatigue. Negative for chills.   HENT: Positive for congestion, ear pain, sore throat and trouble swallowing. Negative for ear discharge.    Eyes: Negative for pain, discharge and redness.   Respiratory: Negative for cough, hemoptysis, sputum production, shortness of breath, wheezing and stridor.    Cardiovascular: Negative for chest pain and palpitations.   Skin: Negative for itching and rash.   Neurological: Positive for headaches.   All other systems reviewed and are negative.    PMH:  has no past medical history on file.  MEDS:   Current Outpatient Medications:   •  amoxicillin (AMOXIL) 500 MG Cap, Take 1 Cap by mouth 2 times a day for 10 days., Disp: 20 Cap, Rfl: 0  ALLERGIES: No Known Allergies  SURGHX: History reviewed. No pertinent surgical history.  SOCHX:  reports that he has been smoking. He has never used smokeless tobacco. He reports that he drinks alcohol. He reports that he does not use drugs.  FH: Family history was reviewed, no pertinent findings to report  Medications, Allergies, and current problem list reviewed today in Epic       Objective:     Blood Pressure 122/74 (BP Location: Left arm, Patient Position: Sitting, BP Cuff Size: Large adult)   Pulse 94   Temperature 37.2 °C (99 °F) (Temporal)   Respiration 16   Height 1.93 m (6' 4\")   Weight 93.4 kg (206 lb)   Oxygen Saturation 96%   Body Mass Index 25.08 kg/m²  "     Physical Exam   Constitutional: He is oriented to person, place, and time. He appears well-developed and well-nourished.  Non-toxic appearance. He does not have a sickly appearance. He does not appear ill. No distress.   HENT:   Head: Normocephalic and atraumatic.   Right Ear: Hearing, tympanic membrane, external ear and ear canal normal.   Left Ear: Hearing, tympanic membrane, external ear and ear canal normal.   Nose: Nose normal.   Mouth/Throat: Uvula is midline and mucous membranes are normal. Oropharyngeal exudate and posterior oropharyngeal erythema present. No posterior oropharyngeal edema or tonsillar abscesses.   Neck: Normal range of motion. Neck supple. No JVD present. No tracheal deviation present. No thyromegaly present.   Cardiovascular: Regular rhythm and normal heart sounds. Exam reveals no gallop and no friction rub.   No murmur heard.  Pulmonary/Chest: Effort normal and breath sounds normal. No stridor. No respiratory distress. He has no wheezes. He has no rales. He exhibits no tenderness.   Lymphadenopathy:     He has cervical adenopathy.   Neurological: He is alert and oriented to person, place, and time.   Skin: Skin is warm and dry.   Psychiatric: He has a normal mood and affect. His behavior is normal. Judgment and thought content normal.   Vitals reviewed.              Assessment/Plan:     1. Exudative tonsillitis  -Tx based on exam and centor criteria 4/5  - POCT Rapid Strep A  - amoxicillin (AMOXIL) 500 MG Cap; Take 1 Cap by mouth 2 times a day for 10 days.  Dispense: 20 Cap; Refill: 0  - MONONUCLEOSIS TEST QUAL; Future  - POCT Mononucleosis (Mono)    Differential diagnosis, natural history, supportive care discussed. Follow-up with primary care provider within 7-10 days, emergency room precautions discussed.  Patient and/or family appears understanding of information.  Handout and review of patients diagnosis and treatment was discussed extensively.

## 2024-09-10 ENCOUNTER — OFFICE VISIT (OUTPATIENT)
Dept: URGENT CARE | Facility: CLINIC | Age: 34
End: 2024-09-10

## 2024-09-10 VITALS
WEIGHT: 228.6 LBS | OXYGEN SATURATION: 99 % | HEIGHT: 76 IN | HEART RATE: 82 BPM | BODY MASS INDEX: 27.84 KG/M2 | TEMPERATURE: 97.9 F | DIASTOLIC BLOOD PRESSURE: 68 MMHG | SYSTOLIC BLOOD PRESSURE: 128 MMHG | RESPIRATION RATE: 18 BRPM

## 2024-09-10 DIAGNOSIS — L03.012 CELLULITIS OF FINGER OF LEFT HAND: ICD-10-CM

## 2024-09-10 PROCEDURE — 99203 OFFICE O/P NEW LOW 30 MIN: CPT | Performed by: PHYSICIAN ASSISTANT

## 2024-09-10 PROCEDURE — 3078F DIAST BP <80 MM HG: CPT | Performed by: PHYSICIAN ASSISTANT

## 2024-09-10 PROCEDURE — 3074F SYST BP LT 130 MM HG: CPT | Performed by: PHYSICIAN ASSISTANT

## 2024-09-10 RX ORDER — SULFAMETHOXAZOLE/TRIMETHOPRIM 800-160 MG
TABLET ORAL
Qty: 14 TABLET | Refills: 0 | Status: SHIPPED | OUTPATIENT
Start: 2024-09-10

## 2024-09-10 ASSESSMENT — ENCOUNTER SYMPTOMS
EDEMA: 1
MYALGIAS: 0
FEVER: 0
WEAKNESS: 0
TINGLING: 0
CHILLS: 0

## 2024-09-11 NOTE — PROGRESS NOTES
"  Subjective:     Inessa Byers  is a 33 y.o. male who presents for Edema (X1day left index finger red/swollen/drainage possible infected )      Edema  Pertinent negatives include no chills, fever, myalgias or weakness.   Patient presents urgent care to concern for infection in the left hand index finger.  He works as a  and noted the irritation last night as he was finishing work.  Has had an increase in achy discomfort to left hand index finger as well as a progression of redness and swelling.  He denies any systemic symptoms denying fevers and chills.  Patient is right-hand dominant.  Patient has a history of MRSA recurrent infections over a decade ago.  He states at that time he had multiple abscesses successively all over her body that was difficult to treat until he ultimately saw an infectious disease specialist.      Review of Systems   Constitutional:  Negative for chills and fever.   Musculoskeletal:  Negative for myalgias.        Redness and swelling the left hand index finger   Neurological:  Negative for tingling and weakness.       Medications:    This patient does not have an active medication from one of the medication groupers.    Allergies: Patient has no known allergies.    Problem List: Inessa Byers does not have a problem list on file.    Surgical History:  No past surgical history on file.    Past Social Hx: Inessa Byers  reports that he has been smoking. He has never used smokeless tobacco. He reports current alcohol use. He reports that he does not use drugs.     Past Family Hx:  Inessa Byers family history is not on file.     Problem list, medications, and allergies reviewed by myself today in Epic.     Objective:   /68   Pulse 82   Temp 36.6 °C (97.9 °F) (Temporal)   Resp 18   Ht 1.93 m (6' 4\")   Wt 104 kg (228 lb 9.6 oz)   SpO2 99%   BMI 27.83 kg/m²     Physical Exam  Vitals and nursing note reviewed.   Constitutional:       General: He is not in acute " distress.     Appearance: Normal appearance. He is well-developed. He is not diaphoretic.   HENT:      Head: Normocephalic and atraumatic.      Right Ear: External ear normal.      Left Ear: External ear normal.      Nose: Nose normal.   Eyes:      General: No scleral icterus.        Right eye: No discharge.         Left eye: No discharge.      Conjunctiva/sclera: Conjunctivae normal.   Pulmonary:      Effort: Pulmonary effort is normal. No respiratory distress.   Musculoskeletal:         General: Normal range of motion.      Right hand: Swelling and tenderness present. Normal range of motion. Normal strength. Normal sensation. There is no disruption of two-point discrimination. Normal capillary refill.        Hands:       Cervical back: Neck supple.      Comments: Middle and distal phalanx of left hand index finger with diffuse nonfocal edema and erythema, slight superficial scratch seen on flexor aspect of skin at DIPJ   Skin:     General: Skin is warm and dry.      Coloration: Skin is not pale.   Neurological:      Mental Status: He is alert and oriented to person, place, and time.      Coordination: Coordination normal.         Assessment/Plan:   Assessment      1. Cellulitis of finger of left hand  - sulfamethoxazole-trimethoprim (BACTRIM DS) 800-160 MG tablet; Take 1 tab p.o. 2 times daily x 7 days (okay to stop at 5 days of infection is resolved)  Dispense: 14 Tablet; Refill: 0  Supportive care is reviewed with patient/caregiver - recommend to push PO fluids and electrolytes,  take full course of Rx, take with probiotics, observe for resolution  Return to clinic with lack of resolution or progression of symptoms.    I have worn an N95 mask, gloves and eye protection for the entire encounter with this patient.     Differential diagnosis, natural history, supportive care, and indications for immediate follow-up discussed.